# Patient Record
Sex: FEMALE | Race: BLACK OR AFRICAN AMERICAN | NOT HISPANIC OR LATINO
[De-identification: names, ages, dates, MRNs, and addresses within clinical notes are randomized per-mention and may not be internally consistent; named-entity substitution may affect disease eponyms.]

---
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
## 2025-04-23 NOTE — ASSESSMENT
[FreeTextEntry1] : 1) Acne Vulgaris, on Isotretinoin\par Encounter for medication monitoring\par -Pathophysiology of acne and the triad of oil production, dead cells and bacteria discussed as contributing factors. Comedonal and inflammatory components reviewed.\par -reviewed isotretinoin usage, monitoring and side effects. Patient just started 10/20/21 and is tolerating well\par -POC urine test negative in office today, verified in [employer] ([id number])\par -refilled isotretinoin 30 mg BID x 1 month today\par -ordered repeat LFTS and lipid panel to be drawn prior to next visit with Dr. Nicole in1 month\par \par 2) Acne Scarring, Cheeks: \par -advised that acne should be well controlled prior to any treatments (microneedling) for acne scarring\par \par RTC in 1 month\par \par \par Reason MD Jennifer\par Physician, Dermatology & Dermatologic Surgery\par [facility]\par \par \par \par 
Yes